# Patient Record
Sex: MALE | Race: WHITE | URBAN - METROPOLITAN AREA
[De-identification: names, ages, dates, MRNs, and addresses within clinical notes are randomized per-mention and may not be internally consistent; named-entity substitution may affect disease eponyms.]

---

## 2020-10-19 ENCOUNTER — TELEPHONE ENCOUNTER (OUTPATIENT)
Dept: URBAN - METROPOLITAN AREA CLINIC 92 | Facility: CLINIC | Age: 70
End: 2020-10-19

## 2020-10-19 RX ORDER — OMEPRAZOLE 40 MG/1
TAKE 1 CAPSULE (40 MG) BY ORAL ROUTE ONCE DAILY BEFORE A MEAL FOR 90 DAYS CAPSULE, DELAYED RELEASE PELLETS ORAL ONCE A DAY
Qty: 90 | Refills: 2

## 2023-10-18 ENCOUNTER — OFFICE VISIT (OUTPATIENT)
Dept: URBAN - NONMETROPOLITAN AREA CLINIC 4 | Facility: CLINIC | Age: 73
End: 2023-10-18

## 2023-10-20 ENCOUNTER — DASHBOARD ENCOUNTERS (OUTPATIENT)
Age: 73
End: 2023-10-20

## 2023-10-20 ENCOUNTER — OFFICE VISIT (OUTPATIENT)
Dept: URBAN - NONMETROPOLITAN AREA CLINIC 4 | Facility: CLINIC | Age: 73
End: 2023-10-20
Payer: MEDICARE

## 2023-10-20 VITALS
DIASTOLIC BLOOD PRESSURE: 70 MMHG | TEMPERATURE: 97.7 F | BODY MASS INDEX: 29.09 KG/M2 | SYSTOLIC BLOOD PRESSURE: 130 MMHG | HEART RATE: 99 BPM | WEIGHT: 234 LBS | HEIGHT: 75 IN

## 2023-10-20 DIAGNOSIS — K64.9 HEMORRHOIDS, UNSPECIFIED HEMORRHOID TYPE: ICD-10-CM

## 2023-10-20 DIAGNOSIS — K57.90 DIVERTICULOSIS: ICD-10-CM

## 2023-10-20 DIAGNOSIS — G89.4 CHRONIC PAIN DISORDER: ICD-10-CM

## 2023-10-20 DIAGNOSIS — T40.2X5A ADVERSE EFFECT OF OTHER OPIOIDS, INITIAL ENCOUNTER: ICD-10-CM

## 2023-10-20 DIAGNOSIS — A02.9 SALMONELLA: ICD-10-CM

## 2023-10-20 DIAGNOSIS — K59.03 DRUG INDUCED CONSTIPATION: ICD-10-CM

## 2023-10-20 DIAGNOSIS — I25.10 CORONARY ARTERY DISEASE INVOLVING NATIVE CORONARY ARTERY OF NATIVE HEART WITHOUT ANGINA PECTORIS: ICD-10-CM

## 2023-10-20 PROBLEM — 397881000: Status: ACTIVE | Noted: 2023-10-20

## 2023-10-20 PROBLEM — 373621006: Status: ACTIVE | Noted: 2023-10-20

## 2023-10-20 PROBLEM — 53741008: Status: ACTIVE | Noted: 2023-10-20

## 2023-10-20 PROCEDURE — 99204 OFFICE O/P NEW MOD 45 MIN: CPT | Performed by: REGISTERED NURSE

## 2023-10-20 RX ORDER — ATORVASTATIN CALCIUM 40 MG/1
TABLET, FILM COATED ORAL
Qty: 0 | Refills: 0 | Status: ACTIVE | COMMUNITY
Start: 2017-09-29

## 2023-10-20 RX ORDER — METOPROLOL SUCCINATE 100 MG/1
TABLET, EXTENDED RELEASE ORAL
Qty: 0 | Refills: 0 | Status: ACTIVE | COMMUNITY
Start: 2017-11-10

## 2023-10-20 RX ORDER — HYDROCODONE BITARTRATE AND ACETAMINOPHEN 10; 325 MG/1; MG/1
TABLET ORAL
Qty: 0 | Refills: 0 | Status: ACTIVE | COMMUNITY
Start: 2017-11-24

## 2023-10-20 RX ORDER — OMEPRAZOLE 40 MG/1
TAKE 1 CAPSULE (40 MG) BY ORAL ROUTE ONCE DAILY BEFORE A MEAL FOR 90 DAYS CAPSULE, DELAYED RELEASE PELLETS ORAL ONCE A DAY
Qty: 90 | Refills: 2 | Status: ACTIVE | COMMUNITY

## 2023-10-20 RX ORDER — ALLOPURINOL 300 MG/1
TABLET ORAL
Qty: 0 | Refills: 0 | Status: ACTIVE | COMMUNITY
Start: 2017-09-16

## 2023-10-20 RX ORDER — LISINOPRIL 20 MG/1
TABLET ORAL
Qty: 0 | Refills: 0 | Status: ACTIVE | COMMUNITY
Start: 2017-11-10

## 2023-10-20 RX ORDER — TRAMADOL HYDROCHLORIDE 50 MG/1
TABLET ORAL
Qty: 0 | Refills: 0 | Status: DISCONTINUED | COMMUNITY
Start: 2017-11-28

## 2023-10-20 NOTE — HPI-TODAY'S VISIT:
PMH-GOUT,HLD, HTN, CAD, PUD PSH-CABG  ALL-DENIES ETOH, OR DRUG USE. FORMERTOBACCO  FH-DENIES GI CANCERS OR DISEASES  FOLLOW UP 1/22/18-PATIENT PRESENTS FOR FOLLOW UP. HE IS DOING WELL. EGDSHOWED LA GRADE B ESOPHAGITIS. COLONOSCOPY SHOWED DIVERTICULOSIS ANDHEMORRHOIDS. HE COMPLAINS OF SOME BURNING AND INDIGESTION SYMPTOMS.FOLLOW UP 8/27/19-PATIENT PRESENTS FOR FOLLOW UP. HE COMPLAINS OFPERSISTENT RIGHT LOWER QUADRANT GROIN DISCOMFORT. PAIN IS DESCRIBED ASBURNING SENSATION. IT IS WORSE WITH BOWEL MOVEMENT. NO ALLEVIATINGFACTORS.  10/20/23: Pt is a 72 yo male with PMH of HTN, HLD, CAD s/p CABG, diverticulosis, OIC who was referred by Hilaria Cristina NP, for evaluation of Salmonella infection.  Pt reports acute onset of diarrhea about a month ago. He did stool studies and was found to have Salmonella. His PCP prescribed Cipro. He continued to have diarrhea and repeat stool culture still showed Salmonella. He was recenlty prescribed Levoquin x 10 days and is feeling somewhat better. Pt reports chronic lower abdominal pain/burning for several years.Had right inguinal hernia repair with mesh in Sept 2022. Had cscope by Dr. Cazares in April 2023 which revealed diverticulosis and hemorrhoids. He takes Lortab for chronic shoulder pain. He has OIC and takes a stool softener as needed. He typically has a BM daily, but stools are small. Denies hematochezia.

## 2023-12-13 ENCOUNTER — OFFICE VISIT (OUTPATIENT)
Dept: URBAN - NONMETROPOLITAN AREA CLINIC 4 | Facility: CLINIC | Age: 73
End: 2023-12-13

## 2024-10-28 ENCOUNTER — TELEPHONE ENCOUNTER (OUTPATIENT)
Dept: URBAN - METROPOLITAN AREA CLINIC 54 | Facility: CLINIC | Age: 74
End: 2024-10-28

## 2024-10-29 ENCOUNTER — OFFICE VISIT (OUTPATIENT)
Dept: URBAN - NONMETROPOLITAN AREA CLINIC 4 | Facility: CLINIC | Age: 74
End: 2024-10-29
Payer: MEDICARE

## 2024-10-29 ENCOUNTER — LAB OUTSIDE AN ENCOUNTER (OUTPATIENT)
Dept: URBAN - NONMETROPOLITAN AREA CLINIC 4 | Facility: CLINIC | Age: 74
End: 2024-10-29

## 2024-10-29 VITALS
SYSTOLIC BLOOD PRESSURE: 134 MMHG | TEMPERATURE: 98.1 F | DIASTOLIC BLOOD PRESSURE: 75 MMHG | HEART RATE: 72 BPM | HEIGHT: 75 IN | WEIGHT: 215.2 LBS | BODY MASS INDEX: 26.76 KG/M2

## 2024-10-29 DIAGNOSIS — R19.5 POSITIVE FIT (FECAL IMMUNOCHEMICAL TEST): ICD-10-CM

## 2024-10-29 DIAGNOSIS — R63.4 UNINTENTIONAL WEIGHT LOSS: ICD-10-CM

## 2024-10-29 DIAGNOSIS — I48.0 PAROXYSMAL A-FIB: ICD-10-CM

## 2024-10-29 DIAGNOSIS — Z79.01 CHRONIC ANTICOAGULATION: ICD-10-CM

## 2024-10-29 DIAGNOSIS — I26.99 ACUTE PULMONARY EMBOLISM, UNSPECIFIED PULMONARY EMBOLISM TYPE, UNSPECIFIED WHETHER ACUTE COR PULMONALE PRESENT: ICD-10-CM

## 2024-10-29 DIAGNOSIS — I25.10 CORONARY ARTERY DISEASE INVOLVING NATIVE CORONARY ARTERY OF NATIVE HEART WITHOUT ANGINA PECTORIS: ICD-10-CM

## 2024-10-29 DIAGNOSIS — N18.32 STAGE 3B CHRONIC KIDNEY DISEASE: ICD-10-CM

## 2024-10-29 DIAGNOSIS — I50.9 CONGESTIVE HEART FAILURE, UNSPECIFIED HF CHRONICITY, UNSPECIFIED HEART FAILURE TYPE: ICD-10-CM

## 2024-10-29 PROBLEM — 700379002: Status: ACTIVE | Noted: 2024-10-29

## 2024-10-29 PROBLEM — 711150003: Status: ACTIVE | Noted: 2024-10-29

## 2024-10-29 PROBLEM — 42343007: Status: ACTIVE | Noted: 2024-10-29

## 2024-10-29 PROBLEM — 282825002: Status: ACTIVE | Noted: 2024-10-29

## 2024-10-29 PROCEDURE — 99214 OFFICE O/P EST MOD 30 MIN: CPT | Performed by: REGISTERED NURSE

## 2024-10-29 RX ORDER — OMEPRAZOLE 40 MG/1
TAKE 1 CAPSULE (40 MG) BY ORAL ROUTE ONCE DAILY BEFORE A MEAL FOR 90 DAYS CAPSULE, DELAYED RELEASE PELLETS ORAL ONCE A DAY
Qty: 90 | Refills: 2 | Status: ACTIVE | COMMUNITY

## 2024-10-29 RX ORDER — APIXABAN 5 MG/1
AS DIRECTED TABLET, FILM COATED ORAL TWICE A DAY
Status: ACTIVE | COMMUNITY

## 2024-10-29 RX ORDER — LISINOPRIL 20 MG/1
TABLET ORAL
Qty: 0 | Refills: 0 | Status: ACTIVE | COMMUNITY
Start: 2017-11-10

## 2024-10-29 RX ORDER — ALLOPURINOL 300 MG/1
TABLET ORAL
Qty: 0 | Refills: 0 | Status: ACTIVE | COMMUNITY
Start: 2017-09-16

## 2024-10-29 RX ORDER — ATORVASTATIN CALCIUM 40 MG/1
TABLET, FILM COATED ORAL
Qty: 0 | Refills: 0 | Status: ACTIVE | COMMUNITY
Start: 2017-09-29

## 2024-10-29 RX ORDER — HYDROCODONE BITARTRATE AND ACETAMINOPHEN 10; 325 MG/1; MG/1
TABLET ORAL
Qty: 0 | Refills: 0 | Status: ACTIVE | COMMUNITY
Start: 2017-11-24

## 2024-10-29 RX ORDER — METOPROLOL SUCCINATE 100 MG/1
TABLET, EXTENDED RELEASE ORAL
Qty: 0 | Refills: 0 | Status: ACTIVE | COMMUNITY
Start: 2017-11-10

## 2024-10-29 NOTE — HPI-TODAY'S VISIT:
PMH-GOUT,HLD, HTN, CAD, PUD PSH-CABG  ALL-DENIES ETOH, OR DRUG USE. FORMERTOBACCO  FH-DENIES GI CANCERS OR DISEASES  FOLLOW UP 1/22/18-PATIENT PRESENTS FOR FOLLOW UP. HE IS DOING WELL. EGDSHOWED LA GRADE B ESOPHAGITIS. COLONOSCOPY SHOWED DIVERTICULOSIS ANDHEMORRHOIDS. HE COMPLAINS OF SOME BURNING AND INDIGESTION SYMPTOMS.FOLLOW UP 8/27/19-PATIENT PRESENTS FOR FOLLOW UP. HE COMPLAINS OFPERSISTENT RIGHT LOWER QUADRANT GROIN DISCOMFORT. PAIN IS DESCRIBED ASBURNING SENSATION. IT IS WORSE WITH BOWEL MOVEMENT. NO ALLEVIATINGFACTORS.  10/20/23: Pt is a 72 yo male with PMH of HTN, HLD, CAD s/p CABG, diverticulosis, OIC who was referred by Hilaria Cristina NP, for evaluation of Salmonella infection.  Pt reports acute onset of diarrhea about a month ago. He did stool studies and was found to have Salmonella. His PCP prescribed Cipro. He continued to have diarrhea and repeat stool culture still showed Salmonella. He was recenlty prescribed Levoquin x 10 days and is feeling somewhat better. Pt reports chronic lower abdominal pain/burning for several years.Had right inguinal hernia repair with mesh in Sept 2022. Had cscope by Dr. Cazares in April 2023 which revealed diverticulosis and hemorrhoids. He takes Lortab for chronic shoulder pain. He has OIC and takes a stool softener as needed. He typically has a BM daily, but stools are small. Denies hematochezia.  10/29/24: Pt was referred by Dr. Ricky Gary for positive FIT on 10/23/24.  A copy of this document will be sent to the referring physician. He denies any overt GI bleeding or changes in bowel habits. He reports unintentional weight loss of 25 lbs since March. Appetite is good. No FHx of CRC. His last cscope was in April 2023 by Dr. Cazares that revealed diverticulosis and hemorrhoids. He had a PE in May. He takes Eliquis for Afib. Follows Dr. Faye Choudhary.

## 2024-10-31 ENCOUNTER — TELEPHONE ENCOUNTER (OUTPATIENT)
Dept: URBAN - NONMETROPOLITAN AREA CLINIC 4 | Facility: CLINIC | Age: 74
End: 2024-10-31

## 2024-11-04 ENCOUNTER — TELEPHONE ENCOUNTER (OUTPATIENT)
Dept: URBAN - NONMETROPOLITAN AREA CLINIC 4 | Facility: CLINIC | Age: 74
End: 2024-11-04

## 2024-11-05 ENCOUNTER — LAB OUTSIDE AN ENCOUNTER (OUTPATIENT)
Dept: URBAN - METROPOLITAN AREA CLINIC 54 | Facility: CLINIC | Age: 74
End: 2024-11-05

## 2024-11-05 ENCOUNTER — TELEPHONE ENCOUNTER (OUTPATIENT)
Dept: URBAN - NONMETROPOLITAN AREA CLINIC 4 | Facility: CLINIC | Age: 74
End: 2024-11-05

## 2024-11-05 ENCOUNTER — TELEPHONE ENCOUNTER (OUTPATIENT)
Dept: URBAN - METROPOLITAN AREA CLINIC 54 | Facility: CLINIC | Age: 74
End: 2024-11-05

## 2024-12-09 ENCOUNTER — OFFICE VISIT (OUTPATIENT)
Dept: URBAN - METROPOLITAN AREA MEDICAL CENTER 24 | Facility: MEDICAL CENTER | Age: 74
End: 2024-12-09